# Patient Record
Sex: MALE | Race: WHITE
[De-identification: names, ages, dates, MRNs, and addresses within clinical notes are randomized per-mention and may not be internally consistent; named-entity substitution may affect disease eponyms.]

---

## 2020-06-24 ENCOUNTER — HOSPITAL ENCOUNTER (EMERGENCY)
Dept: HOSPITAL 35 - ER | Age: 40
Discharge: HOME | End: 2020-06-24
Payer: COMMERCIAL

## 2020-06-24 VITALS — HEIGHT: 73 IN | WEIGHT: 220 LBS | BODY MASS INDEX: 29.16 KG/M2

## 2020-06-24 VITALS — SYSTOLIC BLOOD PRESSURE: 140 MMHG | DIASTOLIC BLOOD PRESSURE: 76 MMHG

## 2020-06-24 DIAGNOSIS — Z79.899: ICD-10-CM

## 2020-06-24 DIAGNOSIS — R11.2: ICD-10-CM

## 2020-06-24 DIAGNOSIS — Z79.4: ICD-10-CM

## 2020-06-24 DIAGNOSIS — R55: ICD-10-CM

## 2020-06-24 DIAGNOSIS — E10.649: Primary | ICD-10-CM

## 2020-06-24 DIAGNOSIS — F17.210: ICD-10-CM

## 2020-06-24 LAB
ANION GAP SERPL CALC-SCNC: 6 MMOL/L (ref 7–16)
BASOPHILS NFR BLD AUTO: 0.5 % (ref 0–2)
BUN SERPL-MCNC: 18 MG/DL (ref 7–18)
CALCIUM SERPL-MCNC: 9.2 MG/DL (ref 8.5–10.1)
CHLORIDE SERPL-SCNC: 99 MMOL/L (ref 98–107)
CO2 SERPL-SCNC: 31 MMOL/L (ref 21–32)
CREAT SERPL-MCNC: 0.8 MG/DL (ref 0.7–1.3)
EOSINOPHIL NFR BLD: 1.3 % (ref 0–3)
ERYTHROCYTE [DISTWIDTH] IN BLOOD BY AUTOMATED COUNT: 13.4 % (ref 10.5–14.5)
GLUCOSE SERPL-MCNC: 129 MG/DL (ref 74–106)
GRANULOCYTES NFR BLD MANUAL: 77.5 % (ref 36–66)
HCT VFR BLD CALC: 42.2 % (ref 42–52)
HGB BLD-MCNC: 14.3 GM/DL (ref 14–18)
LYMPHOCYTES NFR BLD AUTO: 12.8 % (ref 24–44)
MCH RBC QN AUTO: 30.8 PG (ref 26–34)
MCHC RBC AUTO-ENTMCNC: 33.9 G/DL (ref 28–37)
MCV RBC: 90.8 FL (ref 80–100)
MONOCYTES NFR BLD: 7.9 % (ref 1–8)
NEUTROPHILS # BLD: 7.5 THOU/UL (ref 1.4–8.2)
PLATELET # BLD: 262 THOU/UL (ref 150–400)
POTASSIUM SERPL-SCNC: 3.4 MMOL/L (ref 3.5–5.1)
RBC # BLD AUTO: 4.65 MIL/UL (ref 4.5–6)
SODIUM SERPL-SCNC: 136 MMOL/L (ref 136–145)
WBC # BLD AUTO: 9.7 THOU/UL (ref 4–11)

## 2020-06-24 NOTE — EMS
[address]   [address]                     EMS Patient Care Report       
_______________________________________________________________________________
 
Name:       GARY GUERRERO                  Room #:                     DEP FROY QUINTERO#:      [id number]                       Account #:      [id number]  
Admission:  20    Attend Phys:                          
Discharge:  20    Date of Birth:  [birth year]  
                                                          Report #: [id number]
                                                                    [id number]
_______________________________________________________________________________
THIS REPORT FOR:   //name//                          
 
Report Transmitted: 2020 20:43
EMS Care Summary
[facility] MED-[facility]
Incident [id number] @ 2020 10:17
 
Incident Location
[address] [place] [address]
 
Patient
GARY GUERRERO
Male, 39 Years
 1980
 
Patient Address
[address] [place] [id number]
 
Patient History
Diabetes,Type 1 Diabetes,
 
 
Patient Medications
Insulin,
 
Chief Complaint
hypoglycemia
 
Disposition
Transported No Lights/[place]
 
Dispatch Reason
Diabetic Problem
 
Transported To
[facility]
 
Narrative
 arrived on scene to find the patient in a loading dock area. Patient's 
uncle met EMS and stated that him and his nephew were moving somebody in and he 
started to act strange so he went to get him some sugar know he is a diabetic 
and when he came back patient was collapsed on the ground. Patient was 
extremely diaphoretic and very combative, BG obtained 2x IV attempts made but 
 
 
 
[address]   [address]                     EMS Patient Care Report       
_______________________________________________________________________________
 
Name:       GARY GUERRERO                  Room #:                     DEP FROY QUINTERO#:      [id number]                       Account #:      [id number]  
Admission:  20    Attend Phys:                          
Discharge:  20    Date of Birth:  [birth year]  
                                                          Report #: [id number]
                                                                    [id number]
_______________________________________________________________________________
not successful due to how strong and combative patient was fire and ems crew 
was unable to restrain him enough for IV. 1mg glucagon was administered when 
when patient relaxed and was no longer combative but still altered he was moved 
to the cot then to the ambulance were a complete set of vitals was able to be 
obtained and IV access made. D10 was then administered after patient's BG was 
at 44. Patient started to become more awake and when arriving at the hospital 
he was able to answer all A&Ox4 questions but still stated he felt "foggy" and 
was not 100%. He advised that he started to feel foggy when his BG is 100 and 
stated that he normally runs around 200. Patient's BG was 122 when arriving at 
the hospital. Patient had no other complaints. 
 
Initial Vitals
@10:26Glucose: 20,
@10:45P: 66,R: 18,BP: 184/78,Pain: 0/10,GCS: 13,SpO2: 98,Revised Trauma: 12,
@10:52P: 65,R: 18,BP: 180/95,Pain: 0/10,GCS: 15,Glucose: 122,SpO2: 100,Revised 
Trauma: 12, 
 
Assessments
@11:14MENTAL:Combative,Confused,SKIN:No Abnormalities,HEENT:Head/Face: No 
Abnormalities,Eyes: No Abnormalities,Neck/Airway: No Abnormalities,LUNG 
SOUNDS:General: No Abnormalities,Left Upper: No Abnormalities,Right Upper: No 
Abnormalities,Left Lower: No Abnormalities,Right Lower: No 
Abnormalities,ABDOMEN:General: No Abnormalities,Left Upper: No 
Abnormalities,Right Upper: No Abnormalities,Left Lower: No Abnormalities,Right 
Lower: No Abnormalities,PELVIS//GI:No Abnormalities,EXTREMITIES:Left Arm: No 
Abnormalities,Right Arm: No Abnormalities,Left Leg: No Abnormalities,Right Leg: 
No Abnormalities,PULSE:NEURO:No Abnormalities, 
 
Impression
Diabetic Hypoglycemia
 
Procedures
@10:27Saline Lock 0cc (20 ga) Site: Antecubital-RightResponse: 
UnchangedFailed@10:30Saline Lock 0cc (20 ga) Site: Antecubital-LeftResponse: 
UnchangedFailed@10:50Normal Saline (.9% NaCl) 200cc (20 ga) Site: 
Antecubital-LeftResponse: UnchangedSucceeded@10:34Glucagon - 1 Milligrams (mg) 
- Intramuscular (IM)Response: Improved@10:51Dextrose 10% - 150 Milliliters (ml) 
- Intravenous (IV)Response: Improved 
 
Timeline
10:15,Call Received
10:15,Psap Call
10:17,Dispatched
10:18,En Route
10:23,On Scene
10:25,At Patient
 
 
 
[address]   [address]                     EMS Patient Care Report       
_______________________________________________________________________________
 
Name:       GARY GUERRERO                  Room #:                     [facility]  
INGRID#:      [id number]                       Account #:      [id number]  
Admission:  20    Attend Phys:                          
Discharge:  20    Date of Birth:  [birth year]  
                                                          Report #: [id number]
                                                                    [id number]
_______________________________________________________________________________
10:26,BP: / M,PULSE: ,RR:  R,SPO2:  Ox,ETCO2:  ,B,PAIN: ,GCS: ,
10:27,Saline Lock 0cc 20 ga Site: Antecubital-Right,Response: UnchangedFailed,
10:30,Saline Lock 0cc 20 ga Site: Antecubital-Left,Response: UnchangedFailed,
10:34,Glucagon - 1 Milligrams (mg) - Intramuscular (IM),Response: Improved
10:45,BP: 184/78 M,PULSE: 66,RR: 18 R,SPO2: 98 Ox,ETCO2:  ,BG: ,PAIN: 0,GCS: 13,
10:50,Normal Saline (.9% NaCl) 200cc 20 ga Site: Antecubital-Left,Response: 
UnchangedSucceeded, 
10:51,Dextrose 10% - 150 Milliliters (ml) - Intravenous (IV),Response: Improved
10:52,BP: 180/95 M,PULSE: 65,RR: 18 R,SPO2: 100 Ox,ETCO2:  ,B,PAIN: 
0,GCS: 15, 
10:53,Depart Scene
11:06,At Destination
11:44,Call Closed
 
Disclaimer
v1.1     Copyright 2020 [employer]
This EMS Care Summary contains data elements from the applicable legal record 
(which may be displayed differently). It is designed to provide pertinent 
information for the following purposes: continuity of care, clinical quality, 
and state data reporting. The complete legal record is available to ED staff 
and administrators of the receiving hospital in [employer]'s Patient Tracker. All data 
is provided "as is."

## 2020-06-24 NOTE — EKG
[facility]
Felisha [address]
[place]   [address]                     ELECTROCARDIOGRAM REPORT      
_______________________________________________________________________________
 
Name:       GARY GUERRERO                  Room #:                     [facility]#:      [id number]                       Account #:      [id number]  
Admission:  20    Attend Phys:                          
Discharge:  20    Date of Birth:  [birth year]  
                                                          Report #: [id number]
                                                                    [id number]
_______________________________________________________________________________
THIS REPORT FOR:  
 
cc:  LEANNA - Susan family physician/PCP 
     LEANNA - Susan family physician/PCP 
     Sherman Johnson MD                                            ~
THIS REPORT FOR:   //name//                          
 
                         [facility] ED
                                       
Test Date:    2020               Test Time:    11:54:57
Pat Name:     GARY GUERRERO               Department:   
Patient ID:   [id number]            Room:          
Gender:                               Technician:   [facility]
:          1980               Requested By: Kallie Odonnell
Order Number: [id number] MD:   Sherman Johnson
                                 Measurements
Intervals                              Axis          
Rate:         84                       P:            59
IA:           159                      QRS:          0
QRSD:         119                      T:            41
QT:           418                                    
QTc:          495                                    
                           Interpretive Statements
Sinus rhythm
Probable left atrial enlargement
Incomplete right bundle branch block
No previous ECG available for comparison
Electronically Signed On 2020 15:46:31 CDT by Sherman Johnson
[email or web address]
 
 
 
 
 
 
 
 
 
 
 
 
 
 
 
  <ELECTRONICALLY SIGNED>
   By: Sherman Johnson MD        
  20     1546
D: 20 1154                           _____________________________________
T: 20 1154                           Sherman Johnson MD          /ZANA